# Patient Record
Sex: MALE | Race: WHITE
[De-identification: names, ages, dates, MRNs, and addresses within clinical notes are randomized per-mention and may not be internally consistent; named-entity substitution may affect disease eponyms.]

---

## 2020-10-30 ENCOUNTER — HOSPITAL ENCOUNTER (EMERGENCY)
Dept: HOSPITAL 94 - ER | Age: 55
Discharge: HOME | End: 2020-10-30
Payer: MEDICAID

## 2020-10-30 VITALS — HEIGHT: 71 IN | BODY MASS INDEX: 27.22 KG/M2 | WEIGHT: 194.4 LBS

## 2020-10-30 VITALS — SYSTOLIC BLOOD PRESSURE: 134 MMHG | DIASTOLIC BLOOD PRESSURE: 83 MMHG

## 2020-10-30 DIAGNOSIS — M25.561: Primary | ICD-10-CM

## 2020-10-30 DIAGNOSIS — F15.10: ICD-10-CM

## 2020-10-30 DIAGNOSIS — F12.10: ICD-10-CM

## 2020-10-30 DIAGNOSIS — Z59.0: ICD-10-CM

## 2020-10-30 DIAGNOSIS — F17.200: ICD-10-CM

## 2020-10-30 DIAGNOSIS — G89.29: ICD-10-CM

## 2020-10-30 PROCEDURE — 96372 THER/PROPH/DIAG INJ SC/IM: CPT

## 2020-10-30 PROCEDURE — 93971 EXTREMITY STUDY: CPT

## 2020-10-30 PROCEDURE — 99283 EMERGENCY DEPT VISIT LOW MDM: CPT

## 2020-10-30 PROCEDURE — 99284 EMERGENCY DEPT VISIT MOD MDM: CPT

## 2020-10-30 PROCEDURE — 73564 X-RAY EXAM KNEE 4 OR MORE: CPT

## 2020-10-30 SDOH — ECONOMIC STABILITY - HOUSING INSECURITY: HOMELESSNESS: Z59.0

## 2020-10-30 NOTE — NUR
While attempting to take patient's BP during triage, pt became agitated and 
ripped BP cuff off arm. I told pt I needed to get a set of vitals. Pt stated "I 
know, but you dont need to leave it tight to teach me a lesson". When asked for 
clarification, pt referenced that I had informed him that the BP cuff will 
squeeze very tight if you keep moving the [email or web address] Pt then allowed me to replace BP 
cuff and take BP.